# Patient Record
Sex: MALE | Race: OTHER | Employment: OTHER | URBAN - METROPOLITAN AREA
[De-identification: names, ages, dates, MRNs, and addresses within clinical notes are randomized per-mention and may not be internally consistent; named-entity substitution may affect disease eponyms.]

---

## 2017-01-03 ENCOUNTER — OFFICE VISIT (OUTPATIENT)
Dept: NEUROLOGY | Age: 72
End: 2017-01-03

## 2017-01-03 VITALS
SYSTOLIC BLOOD PRESSURE: 130 MMHG | RESPIRATION RATE: 18 BRPM | HEART RATE: 74 BPM | OXYGEN SATURATION: 92 % | DIASTOLIC BLOOD PRESSURE: 72 MMHG

## 2017-01-03 DIAGNOSIS — I63.9 CEREBROVASCULAR ACCIDENT (CVA), UNSPECIFIED MECHANISM (HCC): Primary | ICD-10-CM

## 2017-01-03 RX ORDER — MAGNESIUM HYDROXIDE 400 MG/5ML
SUSPENSION, ORAL (FINAL DOSE FORM) ORAL DAILY
COMMUNITY

## 2017-01-03 RX ORDER — BISMUTH SUBSALICYLATE 262 MG
1 TABLET,CHEWABLE ORAL DAILY
COMMUNITY

## 2017-01-03 NOTE — PROGRESS NOTES
Matthew Alejandro is a 66 y.o. male who presents with the following  Chief Complaint   Patient presents with   [facility] TIA     hospital follow up       HPI Patient comes in for a follow up for TIA. He states he is doing well since his discharge. He is currently taking Aspirin 81 mg and Lipitor 20 mg for further prevention. He [place] and his feet and legs felt weak and went numb in line. He was having some trouble with his balance and standing also so he went to sit down for about 30 minutes and then things got a little better. Went to drive home and got to the [facility] and pulled over and things got bad again so then he went to the [facility] and then was sent to [address] [place] [address]. He was found to have 1. Small areas of late acute to early subacute infarction superimposed on small  foci of chronic infarction in the right anterior cerebral artery territory. 2. 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 3. 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 On his MRI. He states since discharge his symptoms have completely subsided. Can not remember if this has happened before. Once walking to mailbox he had similar symptoms in his legs but this went away pretty quickly thereafter. Was not on any medications before this. Mother hx htn and hyperlipid and father hx stroke.        No Known Allergies    Current Outpatient Prescriptions   Medication Sig    multivitamin (ONE A DAY) tablet Take 1 Tab by mouth daily.  Potassium Gluconate 595 mg (99 mg) tablet Take  by mouth daily.  aspirin 81 mg chewable tablet Take 1 Tab by mouth daily.  atorvastatin (LIPITOR) 20 mg tablet Take 1 Tab by mouth nightly.  lisinopril (PRINIVIL, ZESTRIL) 20 mg tablet Take 20 mg by mouth daily. No current facility-administered medications for this visit.         History   Smoking Status    Never Smoker   Smokeless Tobacco    Never Used       Past Medical History   Diagnosis Date  CVA (cerebral vascular accident) ([facility] [address] 12/15/2016     MRI brain:  Small areas of late acute to early subacute infarction superimposed on small foci of chronic infarction in the right anterior cerebral artery territory. Thrombosis versus severe stenosis of the distal right anterior cerebral artery. 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  Hepatitis A 1985    Hypertension     Liver disease 1970     Hep B       Past Surgical History   Procedure Laterality Date    Hx heent       tonsils       Family History   Problem Relation Age of Onset    Hypertension Mother     Heart Disease Mother     Stroke Father        Social History     Social History    Marital status:      Spouse name: N/A    Number of children: N/A    Years of education: N/A     Social History Main Topics    Smoking status: Never Smoker    Smokeless tobacco: Never Used    Alcohol use No    Drug use: None    Sexual activity: Not Asked     Other Topics Concern    None     Social History Narrative       Review of Systems   HENT: Negative for hearing loss and tinnitus. Eyes: Negative for blurred vision, double vision and photophobia. Respiratory: Negative for shortness of breath and wheezing. Cardiovascular: Negative for chest pain and palpitations. Gastrointestinal: Negative for nausea and vomiting. Musculoskeletal: Negative for falls. Neurological: Negative for dizziness, tingling, tremors, seizures, loss of consciousness, weakness and headaches. Psychiatric/Behavioral: Negative for memory loss. Remainder of comprehensive review is negative. Physical Exam :    Visit Vitals    /72    Pulse 74    Resp 18    SpO2 92%           Orders Placed This Encounter    multivitamin (ONE A DAY) tablet     Sig: Take 1 Tab by mouth daily.  Potassium Gluconate 595 mg (99 mg) tablet     Sig: Take  by mouth daily. No diagnosis found.     Follow-up Disposition:  Return if symptoms worsen or fail to improve. Post hospitalization. He is doing well since discharge. No symptoms. Everything seemed to resolve. He will continue Aspirin 81 mg and Lipitor 20 mg for further stroke prevention. LDL was 88 in hospital and per stroke guidelines we want below 70 PCP will help with this goal. To see him in March can re-draw and see. Explained how medications work and when to take them and he understands. With his occluded artery we will not work up further as he was seen in the hospital. Continue to follow as watching the other arteries. He understands the s/s of stroke and when to call 911. Is trying to exercise more and understands a healthy diet low in sodium, red meats, and Saturated fats. No further questions. Doing well. Toma Whiting NP        This note will not be viewable in [employer]      Total time: *25min   Counseling / coordination time: 25 min   > 50% counseling / coordination?: Yes re: hospital tests, progression, prevention, medications.

## 2017-01-03 NOTE — PROGRESS NOTES
Patient present for a follow up after being in the hospital. Accompanied with wife. Reports had TIA on 12/14/16, has been doing good since being out of the hospital, hasn't had any symptoms. Just here for a check up.

## 2017-01-03 NOTE — MR AVS SNAPSHOT
Visit Information Date & Time Provider Department Dept. Phone Encounter #  
 1/3/2017  1:30 PM Malachi Lemus NP [facility] [phone] Follow-up Instructions Return if symptoms worsen or fail to improve. Upcoming Health Maintenance Date Due DTaP/Tdap/Td series (1 - Tdap) 12/31/1965 FOBT Q 1 YEAR AGE 50-75 12/31/1994 ZOSTER VACCINE AGE 60> 12/31/2004 GLAUCOMA SCREENING Q2Y 12/31/2009 Pneumococcal 65+ Low/Medium Risk (1 of 2 - PCV13) 12/31/2009 MEDICARE YEARLY EXAM 12/31/2009 INFLUENZA AGE 9 TO ADULT 8/1/2016 Allergies as of 1/3/2017  Review Complete On: 1/3/2017 By: Enoc Shaw LPN No Known Allergies Current Immunizations  Never Reviewed No immunizations on file. Not reviewed this visit Vitals BP Pulse Resp SpO2 Smoking Status 130/72 74 18 92% Never Smoker Preferred Pharmacy Pharmacy Name Phone [id number]aira Jacobo, [address] [place] [address] [phone] Your Updated Medication List  
  
   
This list is accurate as of: 1/3/17  1:59 PM.  Always use your most recent med list.  
  
  
  
  
 aspirin 81 mg chewable tablet Take 1 Tab by mouth daily. atorvastatin 20 mg tablet Commonly known as:  LIPITOR Take 1 Tab by mouth nightly. lisinopril 20 mg tablet Commonly known as:  Danielle Ken Take 20 mg by mouth daily. multivitamin tablet Commonly known as:  ONE A DAY Take 1 Tab by mouth daily. Potassium Gluconate 595 mg (99 mg) tablet Take  by mouth daily. Follow-up Instructions Return if symptoms worsen or fail to improve. Patient Instructions PRESCRIPTION REFILL POLICY [facility] Statement to Patients April 1, 2014 In an effort to ensure the large volume of patient prescription refills is processed in the most efficient and expeditious manner, we are asking our patients to assist us by calling your Pharmacy for all prescription refills, this will include also your  Mail Order Pharmacy. The pharmacy will contact our office electronically to continue the refill process. Please do not wait until the last minute to call your pharmacy. We need at least 48 hours (2days) to fill prescriptions. We also encourage you to call your pharmacy before going to  your prescription to make sure it is ready. With regard to controlled substance prescription refill requests (narcotic refills) that need to be picked up at our office, we ask your cooperation by providing us with at least 72 hours (3days) notice that you will need a refill. We will not refill narcotic prescription refill requests after 4:00pm on any weekday, Monday through Thursday, or after 2:00pm on Fridays, or on the weekends. We encourage everyone to explore another way of getting your prescription refill request processed using [employer], our patient web portal through our electronic medical record system. [employer] is an efficient and effective way to communicate your medication request directly to the office and  downloadable as an azeb on your smart phone . [employer] also features a review functionality that allows you to view your medication list as well as leave messages for your physician. Are you ready to get connected? If so please review the attatched instructions or speak to any of our staff to get you set up right away! Thank you so much for your cooperation. Should you have any questions please contact our Practice Administrator. The Physicians and Staff,  [facility] Thank you for choosing [facility] and [facility] for your  
 
care. You may receive a survey about your visit. We appreciate you taking time  
 
to complete this survey as we use your feedback to improve our services.   We  
 
 realize we are not perfect, but we strive to provide excellent care. Introducing [facility]! Paz Sethi introduces [employer] patient portal. Now you can access parts of your medical record, email your doctor's office, and request medication refills online. 1. In your internet browser, go to [email or web address] 2. Click on the First Time User? Click Here link in the Sign In box. You will see the New Member Sign Up page. 3. Enter your [employer] Access Code exactly as it appears below. You will not need to use this code after youve completed the sign-up process. If you do not sign up before the expiration date, you must request a new code. · [employer] Access Code: [email or web address] Expires: 3/15/2017  6:15 PM 
 
4. Enter the last four digits of your Social Security Number (xxxx) and Date of Birth (mm/dd/yyyy) as indicated and click Submit. You will be taken to the next sign-up page. 5. Create a [employer] ID. This will be your [employer] login ID and cannot be changed, so think of one that is secure and easy to remember. 6. Create a [employer] password. You can change your password at any time. 7. Enter your Password Reset Question and Answer. This can be used at a later time if you forget your password. 8. Enter your e-mail address. You will receive e-mail notification when new information is available in [address] 9. Click Sign Up. You can now view and download portions of your medical record. 10. Click the Download Summary menu link to download a portable copy of your medical information. If you have questions, please visit the Frequently Asked Questions section of the [employer] website. Remember, [employer] is NOT to be used for urgent needs. For medical emergencies, dial 911. Now available from your iPhone and Android! Please provide this summary of care documentation to your next provider. Your primary care clinician is listed as Carlos Alberto Coronado. If you have any questions after today's visit, please call [phone].

## 2017-01-03 NOTE — PATIENT INSTRUCTIONS
[facility]   Statement to Patients  April 1, 2014      In an effort to ensure the large volume of patient prescription refills is processed in the most efficient and expeditious manner, we are asking our patients to assist us by calling your Pharmacy for all prescription refills, this will include also your  Mail Order Pharmacy. The pharmacy will contact our office electronically to continue the refill process. Please do not wait until the last minute to call your pharmacy. We need at least 48 hours (2days) to fill prescriptions. We also encourage you to call your pharmacy before going to  your prescription to make sure it is ready. With regard to controlled substance prescription refill requests (narcotic refills) that need to be picked up at our office, we ask your cooperation by providing us with at least 72 hours (3days) notice that you will need a refill. We will not refill narcotic prescription refill requests after 4:00pm on any weekday, Monday through Thursday, or after 2:00pm on Fridays, or on the weekends. We encourage everyone to explore another way of getting your prescription refill request processed using [employer], our patient web portal through our electronic medical record system. [employer] is an efficient and effective way to communicate your medication request directly to the office and  downloadable as an azeb on your smart phone . [employer] also features a review functionality that allows you to view your medication list as well as leave messages for your physician. Are you ready to get connected? If so please review the attatched instructions or speak to any of our staff to get you set up right away! Thank you so much for your cooperation. Should you have any questions please contact our Practice Administrator.     The Physicians and Staff,  [facility]       Thank you for choosing [facility] for your     care. You may receive a survey about your visit. We appreciate you taking time     to complete this survey as we use your feedback to improve our services. We     realize we are not perfect, but we strive to provide excellent care.